# Patient Record
(demographics unavailable — no encounter records)

---

## 2025-02-20 NOTE — HISTORY OF PRESENT ILLNESS
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 When on ULT, flares were controlled but has since stopped taking it. New to me today, here to re-establish rheum care 2/2 5 further flares since 2/2019, most recent flare in April, primarily in midfoot, self -resolves with colchicine and NSAIDs. No SE with allopurinol in the past, would be amenable to resuming. No dietary triggers. No active joints today.   + OA in b/l knees -- injections didn't help, attempting to hold off on TKR until he's older   ----------- 1/26/21 -- 1 flare in R knee in setting of ice skating, resolved with steroids. No issues with ULT or colchicine. Ongoing OA pain in b/l knees but able to get thru work and ADLs.   7/27/21 -- b/l knee flare in May, no precipitating factors, responded to the steroid taper. Milder flare in R ankle s/p burgers and beer in early July, responded to 3 days of colchicine. Remains on ULT, no tophi, no other flares. OA in knees stable, not limiting him.   2/11/22 -- mild unilateral knee flare last summer, responsive to colchicine, tho had poison ivy at the time and was also on a medrol dose pack. No further flares, stable OA related pain in knees, tolerating meds well.   2/16/23 -- 1 b/l knee flare in setting of dehydration, not responsive to colchicine, resolved with steroid taper, otherwise has been quiescent.   2/15/24 -- Knee pain x 1 in summer, improved with steroid taper but didn't have synovitis. No gout flares, tolerating ULT well. Diffuse joint pain stable, trying to do less of the heavy labor at work which is helping.   2/20/25 -- No gout flares, tolerating ULT well. Use related knee pain, has been using short steroid tapers sparingly x 3 days when severe with excellent benefit. Chronic LBP without radicular sx is stable, managed conservatively.

## 2025-02-20 NOTE — ASSESSMENT
[FreeTextEntry1] : ILZETTE GENTILE is a 46 year old man with --  # recurrent episodes of crystalline arthritis in setting of hyperuricemia, with less frequent flares since ULT resumed, responsive to steroids, sUA stable at <6.   # advanced OA in b/l knees with chondrocalcinosis # MSK mediated chronic LBP without radicular sx  - c/w allopurinol 300mg daily  - off colchicine as not helpful  - has steroid taper in case of flare - Rx renewed - allowed to use PRN for OA related flares as well 2/2 very physically demanding job - labs reviewed and stable, repeat labs prior to next visit  - c/w home exercises to maintain knee ROM  - c/w conservative measures for low back, can use steroid taper PRN sciatica if develops  RTC in 12 months, sooner if recurrent flares -patient advised to come at stated appointment time.

## 2025-02-20 NOTE — PHYSICAL EXAM
[General Appearance - Alert] : alert [General Appearance - In No Acute Distress] : in no acute distress [General Appearance - Well Nourished] : well nourished [Neck Appearance] : the appearance of the neck was normal [Edema] : there was no peripheral edema [No Spinal Tenderness] : no spinal tenderness [Abnormal Walk] : normal gait [Nail Clubbing] : no clubbing  or cyanosis of the fingernails [Musculoskeletal - Swelling] : no joint swelling seen [Motor Tone] : muscle strength and tone were normal [No Focal Deficits] : no focal deficits [Oriented To Time, Place, And Person] : oriented to person, place, and time [Impaired Insight] : insight and judgment were intact [Affect] : the affect was normal [] : the neck was supple [Respiration, Rhythm And Depth] : normal respiratory rhythm and effort [FreeTextEntry1] : OA in b/l knees, no effusions or synovitis, no tophi, ROM diffusely intact